# Patient Record
Sex: FEMALE | Race: WHITE | Employment: OTHER | URBAN - METROPOLITAN AREA
[De-identification: names, ages, dates, MRNs, and addresses within clinical notes are randomized per-mention and may not be internally consistent; named-entity substitution may affect disease eponyms.]

---

## 2019-10-14 ENCOUNTER — TELEPHONE (OUTPATIENT)
Dept: ONCOLOGY | Facility: CLINIC | Age: 84
End: 2019-10-14

## 2019-10-14 NOTE — TELEPHONE ENCOUNTER
ONCOLOGY INTAKE: Records Information      APPT INFORMATION:  Referring provider: Dr. Karina Wright   Referring provider s clinic:  [facility]  Reason for visit/diagnosis:  n/a  Has patient been notified of appointment date and time?: n/a    RECORDS INFORMATION:  Were the records received with the referral (via [employer])? Yes - this is documentation that Oncology Intake received records from [facility], but there is no reference in the notes for her to be seen at [facility].  I called [facility] Medical Records to let them know about the fax, and they said they'd look into it.  I have saved this document on the [address] in care there are missing pages to the fax which should have included referral information.

## 2019-10-21 NOTE — TELEPHONE ENCOUNTER
ONCOLOGY INTAKE: Records Information      APPT INFORMATION: 10/25/19 - GE -SH  Referring provider: Helene Yan   Referring provider s clinic:    Reason for visit/diagnosis:  Breast CA, 2nd opinion  Has patient been notified of appointment date and time?: Yes    RECORDS INFORMATION:  Were the records received with the referral (via [employer])? Yes (faxed to records team & HIM for scanning)    Has patient been seen for any external appt for this diagnosis? Yes    If yes, where? [facility]    Has patient had any imaging or procedures outside of [facility] for this condition? Yes      If Yes, where? [facility]    ADDITIONAL INFORMATION:  Scheduled via call from patient

## 2019-10-22 NOTE — TELEPHONE ENCOUNTER
RECORDS STATUS - BREAST    RECORDS REQUESTED FROM: Epic/CE   DATE REQUESTED: 10/25/2019    NOTES DETAILS STATUS   OFFICE NOTE from referring provider Complete  Helene Yan      OFFICE NOTE from medical oncologist Complete Epic    OFFICE NOTE from surgeon Complete EPIC   OFFICE NOTE from radiation oncologist N/A    DISCHARGE SUMMARY from hospital Complete Epic    DISCHARGE REPORT from the ER N/A    OPERATIVE REPORT Complete EPIC   MEDICATION LIST Complete CE-     CLINICAL TRIAL TREATMENTS TO DATE     LABS     PATHOLOGY REPORTS  (Tissue diagnosis, Stage, ER/PA percentage positive and intensity of staining, HER2 IHC, FISH, and all biopsies from breast and any distant metastasis)                 Complete- Bx slides arrived on 11/8/2019  Case: [id number] [facility] 10/22/2019      Case: [id number] [facility] 9/12/2019   Tracking Number: [id number]   GENONOMIC TESTING     TYPE:   (Next Generation Sequencing, including Foundation One testing, and Oncotype score)     IMAGING (NEED IMAGES & REPORT)     CT SCANS     [facility]   NM Breast  Complete  HP    MAMMO Complete  HP    ULTRASOUND Complete  HP    PET Complete      BONE SCAN     BRAIN MRI       Action    Action Taken 10/22/2019 3:15pm     * [facility]:   - Imaging Dept [place] [phone] option 4 IMG will be pushed to PACS.     I sent a fax request for bx to      11/6/2019 10:21am   I received a call from [facility]. They aren't able to locate their slides from 9/12/2019 Case: [id number]. They are going to send over the other path case in the meantime.     I send an ib-message to Dr. Phoenix letting her know that [facility] is missing the path slides from this year.

## 2019-10-25 ENCOUNTER — PRE VISIT (OUTPATIENT)
Dept: ONCOLOGY | Facility: CLINIC | Age: 84
End: 2019-10-25

## 2019-10-25 ENCOUNTER — ONCOLOGY VISIT (OUTPATIENT)
Dept: ONCOLOGY | Facility: CLINIC | Age: 84
End: 2019-10-25
Attending: INTERNAL MEDICINE
Payer: COMMERCIAL

## 2019-10-25 VITALS
WEIGHT: 124 LBS | OXYGEN SATURATION: 97 % | DIASTOLIC BLOOD PRESSURE: 62 MMHG | HEART RATE: 77 BPM | RESPIRATION RATE: 16 BRPM | SYSTOLIC BLOOD PRESSURE: 128 MMHG | HEIGHT: 65 IN | BODY MASS INDEX: 20.66 KG/M2

## 2019-10-25 DIAGNOSIS — Z17.1 MALIGNANT NEOPLASM OF CENTRAL PORTION OF RIGHT BREAST IN FEMALE, ESTROGEN RECEPTOR NEGATIVE (H): ICD-10-CM

## 2019-10-25 DIAGNOSIS — Z80.9 FAMILY HISTORY OF CANCER: Primary | ICD-10-CM

## 2019-10-25 DIAGNOSIS — C50.111 MALIGNANT NEOPLASM OF CENTRAL PORTION OF RIGHT BREAST IN FEMALE, ESTROGEN RECEPTOR NEGATIVE (H): ICD-10-CM

## 2019-10-25 PROCEDURE — G0463 HOSPITAL OUTPT CLINIC VISIT: HCPCS

## 2019-10-25 PROCEDURE — 99205 OFFICE O/P NEW HI 60 MIN: CPT | Performed by: INTERNAL MEDICINE

## 2019-10-25 RX ORDER — ASCORBIC ACID 250 MG
1 TABLET,CHEWABLE ORAL
COMMUNITY

## 2019-10-25 ASSESSMENT — MIFFLIN-ST. JEOR: SCORE: 1015.4

## 2019-10-25 ASSESSMENT — PAIN SCALES - GENERAL: PAINLEVEL: NO PAIN (0)

## 2019-10-25 NOTE — PROGRESS NOTES
"Oncology Rooming Note    October 25, 2019 11:09 AM   Madiha Veras is a 84 year old female who presents for:    Chief Complaint   Patient presents with     Oncology Clinic Visit     Initial Vitals: /62   Pulse 77   Resp 16   Ht 1.638 m (5' 4.5\")   Wt 56.2 kg (124 lb)   SpO2 97%   BMI 20.96 kg/m   Estimated body mass index is 20.96 kg/m  as calculated from the following:    Height as of this encounter: 1.638 m (5' 4.5\").    Weight as of this encounter: 56.2 kg (124 lb). Body surface area is 1.6 meters squared.  No Pain (0) Comment: Data Unavailable   No LMP recorded.  Allergies reviewed: Yes  Medications reviewed: Yes    Medications: Medication refills not needed today.  Pharmacy name entered into [facility]: [id number] [place], [place] - [address] [place] [address]    Clinical concerns: no      Amy Lopez CMA            "

## 2019-10-25 NOTE — PROGRESS NOTES
NEW PATIENT MEDICAL ONCOLOGY CONSULT      REQUESTING PROVIDER: Dr. Marcelino Werner, Oncologist from [facility]      REASON FOR CONSULTATION:    At age 84, dx right breast ILC grade 2, TN in 2019      HISTORY OF ONCOLOGY ILLNESS:    She is dx via MA 2019 right central breast 6:00, 2 cm FN,  ILC, triple negative.    She had no breast complains on presentation.     The patient opted for bilateral mastectomy with reconstruction.   Her final pathology from her bilateral mastectomy with lymph node dissection which was done on 2019 shows:  1. Invasive lobular carcinoma Chapis grade 2 in the right breast,  5.6 cm in size. The tumor focally extended to the anterior inferior margin. T  2. here was angiolymphatic space invasion. Tumor present in random sections of the breast.   3. Three out of the 3 sentinel lymph nodes were involved with carcinoma. The largest metastatic closest was greater than 1 cm. There was extranodal tumor extension present on all 3 LNs, Level 1/2 LN dissection found 6/15 + LN with extra dara extent ion.  4. The left breast had atypical lobular hyperplasia but no invasive carcinoma.  She has stage III disease.       INTERVAL HISTORY: N/A      PAST MEDICAL HISTORY  Recurrent UTI, urinary incontinence  Onychomycosis, actinic keratosis      MEDICATIONS/ALLERY:  Reviewed in Epic system.        SOCIAL HISTORY:    She is . She is retired. She previously worked as a . She denies tobacco use. Rare alcohol use.       FAMILY HISTORY:    Father side almost everyone  from cancer.   Mother  of breast cancer at age 38.      REVIEW OF SYSTEMS:   GENERAL: pt is in usual state of health.  No B symptoms  NEURO:   No headache, double vision, or focal weakness.  No neuropathy.   SKIN:  No chronic skin rash or skin infection.   CARDIOVASCULAR:  No High blood pressure or hyperlipidemia. NO DAVALOS  PULMONARY:  No shortness of breath, no pleurisy, no cough, no hemoptysis.   GI:   "No abdominal pain, nausea, vomiting, constipation.  No diarrhea.  No bright red blood per rectum.   :  Recurrent UTI, urinary incontinence  RHEUMATOLOGY/MUSCULOSKELETAL SYSTEM:  no arthritic pain, or muscle pain. No muscle ache.   ENDOCRINE:  No history of diabetes or thyroid problem. No complaints of hot flashes.   HEMATOLOGY:  No history of bleeding or thrombosis episode.   Oncology: dx right breast cancer in 2019 at age 84.  IMMUNOLOGY:  No recurrent fever or chills episode.  No recurrent infectious episode.   BREASTS/GYN: No breast complains or vaginal dryness  PSYCHIATRY:  No anxiety or depression.          PHYSICAL EXAMINATION:   VITAL SIGNS: Blood pressure 128/62, pulse 77, resp. rate 16, height 1.638 m (5' 4.5\"), weight 56.2 kg (124 lb), SpO2 97 %.       GENERAL APPEARANCE:  looks like stated age, very pleasant, not in acute distress.     ECO    ENT, MOUTH: Pupils are equally reactive to light.  Sclerae are anicteric.  Moist oral mucosa without lesion or ulcer.   Negative pharynx.  No oral thrush.   NECK:  Supple.  No jugular venous distention.  Thyroid is not palpable.   LYMPH NODES:  Superficial lymphadenopathy is not appreciable in the bilateral cervical, supraclavicular, axillary or inguinal areas.   CARDIOVASCULAR:  S1, S2 regular with no murmurs or gallops.  No carotid or abdominal bruits.   PULMONARY:  Lungs are clear to auscultation and percussion bilaterally.  There is no wheezing or rhonchi.   GASTROINTESTINAL:  Abdomen is soft, nontender.  No hepatosplenomegaly.  No signs of ascites.  No mass appreciable.   MUSCULOSKELETAL/EXTREMITIES:  No edema.  No cyanotic changes.  No signs of joint deformity.  No lymphedema.   NEUROLOGIC:  Cranial nerves II-XII are grossly intact.  Sensation intact.  Muscle strength and muscle tone symmetrical, 5/5 throughout.   BACK  No spinal or paraspinal tenderness.  No CVA tenderness.   SKIN:  No petechiae.  No rash.  No signs of cellulitis. "   BREASTS/CHEST/AXILLA:  Bilateral breast exam revealed well-healed double mastectomy scars, she already have expanders in.    PSYCHIATRIC: Oriented to time, person, and places. Normal mood and affect. Good memory. Proper insight and judgement.       CURRENT LAB DATA REVIEWED  bilateral mastectomy with lymph node dissection which was done on 09/17/2019 shows:  1. Invasive lobular carcinoma Chapis grade 2 in the right breast,  5.6 cm in size. The tumor focally extended to the anterior inferior margin. T  2. here was angiolymphatic space invasion. Tumor present in random sections of the breast.   3. Three out of the 3 sentinel lymph nodes were involved with carcinoma. The largest metastatic closest was greater than 1 cm. There was extranodal tumor extension present. Level 1/2 LN dissection found 6/15 + LN with extra dara extent ion.  4. The left breast had atypical lobular hyperplasia but no invasive carcinoma.    8/2019 US biopsy found invasive lobular carcinoma that was Trinidad grade 2, triple negative.        CURRENT IMAGING REVIEWED  PET scan 9/2019 showed disease that appeared to be confined to the breast without any evidence of regional or distant metastasis.    Breast MRI 9/2019 showed a large right breast malignancy involving primarily the lower outer quadrant that was technically multicentric crossing the midline. There is no evidence of synchronous disease.    Screening mammogram on August 19, 2019 showed an architectural distortion with asymmetry in the right breast in the retroareolar plane at middle depth with new nipple retraction.        OLD DATA REVIEWED TODAY WITH SUMMARY:   No MA in 2017 and 2018.   MA in 2016 - negative.         ASSESSMENT AND PLAN:    At age 84, in September 2019 she is diagnosed with right breast central quadrant invasive lobular cancer triple negative disease with [facility].  She had double mastectomy found extensive disease,  Invasive lobular carcinoma Trinidad  grade 2, 5.6 cm in size, TN. The tumor focally extended to the anterior inferior margin. + angiolymphatic space invasion. Tumor present in random sections of the breast.  3 sentinel lymph nodes were involved with carcinoma. The largest metastatic closest was greater than 1 cm. + extranodal tumor extension present. Level 1/2 LN dissection found 6/15 + LN with extra dara extent ion, pIIIB disease.     We will request the slides to be [facility].    Due to her locally advanced nature, with adverse features, her excellent performance status despite age 84, minimal comorbidities, recommend her to consider adjuvant chemotherapy.    Due to age would consider TC minimal 4 potentially 6 cycles.  If she has hard time with TC, may consider de-escalating the treatment to weekly Taxol.    We discussed chemotherapy side effects,  which include but not limited to GI toxicity,  nausea vomiting, lower immunity and bleeding risk from cytopenia, neurotoxicity, cardica toxicity,  infusion related reaction, mortality, etc.   Patient is informed she will need a port placement for TC.    She will benefit from postmastectomy radiation due to her tumor features, and positive margins.     This is still a second time patient is hearing the above recommendations.  She looks very comfortable in understanding the necessitates  and willing to proceed.    2. Extensive family history of cancer.   Recommend her genetic counseling.       All Qs are answered to pt's satisfaction.

## 2019-10-25 NOTE — LETTER
"    10/25/2019         RE: Madiha Veras  [address] [address]        Dear Colleague,    Thank you for referring your patient, Madiha Veras, to the [facility]. Please see a copy of my visit note below.    Oncology Rooming Note    October 25, 2019 11:09 AM   Madiha Veras is a 84 year old female who presents for:    Chief Complaint   Patient presents with     Oncology Clinic Visit     Initial Vitals: /62   Pulse 77   Resp 16   Ht 1.638 m (5' 4.5\")   Wt 56.2 kg (124 lb)   SpO2 97%   BMI 20.96 kg/m    Estimated body mass index is 20.96 kg/m  as calculated from the following:    Height as of this encounter: 1.638 m (5' 4.5\").    Weight as of this encounter: 56.2 kg (124 lb). Body surface area is 1.6 meters squared.  No Pain (0) Comment: Data Unavailable   No LMP recorded.  Allergies reviewed: Yes  Medications reviewed: Yes    Medications: Medication refills not needed today.  Pharmacy name entered into [employer]: [id number] - [place], [place] - [address]    Clinical concerns: no      Amy Lopez, [facility]              NEW PATIENT MEDICAL ONCOLOGY CONSULT      REQUESTING PROVIDER: Dr. Marcelino Werner, Oncologist from [facility]      REASON FOR CONSULTATION:    At age 84, dx right breast ILC grade 2, TN in 9/2019      HISTORY OF ONCOLOGY ILLNESS:    She is dx via MA 9/2019 right central breast 6:00, 2 cm FN,  ILC, triple negative.    She had no breast complains on presentation.     The patient opted for bilateral mastectomy with reconstruction.   Her final pathology from her bilateral mastectomy with lymph node dissection which was done on 09/17/2019 shows:  1. Invasive lobular carcinoma Palmer grade 2 in the right breast,  5.6 cm in size. The tumor focally extended to the anterior inferior margin. T  2. here was angiolymphatic space invasion. Tumor present in random sections of the breast.   3. Three out of the 3 sentinel lymph nodes were " "involved with carcinoma. The largest metastatic closest was greater than 1 cm. There was extranodal tumor extension present.   4. The left breast had atypical lobular hyperplasia but no invasive carcinoma.  She has stage III disease.       INTERVAL HISTORY: N/A      PAST MEDICAL HISTORY  Recurrent UTI, urinary incontinence  Onychomycosis, actinic keratosis      MEDICATIONS/ALLERY:  Reviewed in Epic system.        SOCIAL HISTORY:    She is . She is retired. She previously worked as a . She denies tobacco use. Rare alcohol use.       FAMILY HISTORY:    Father side almost everyone  from cancer.   Mother  of breast cancer at age 38.      REVIEW OF SYSTEMS:   GENERAL: pt is in usual state of health.  No B symptoms  NEURO:   No headache, double vision, or focal weakness.  No neuropathy.   SKIN:  No chronic skin rash or skin infection.   CARDIOVASCULAR:  No High blood pressure or hyperlipidemia. NO DAVALOS  PULMONARY:  No shortness of breath, no pleurisy, no cough, no hemoptysis.   GI:  No abdominal pain, nausea, vomiting, constipation.  No diarrhea.  No bright red blood per rectum.   :   Recurrent UTI, urinary incontinence  RHEUMATOLOGY/MUSCULOSKELETAL SYSTEM:  no arthritic pain, or muscle pain. No muscle ache.   ENDOCRINE:  No history of diabetes or thyroid problem. No complaints of hot flashes.   HEMATOLOGY:  No history of bleeding or thrombosis episode.   Oncology: dx right breast cancer in 2019 at age 84.  IMMUNOLOGY:  No recurrent fever or chills episode.  No recurrent infectious episode.   BREASTS/GYN: No breast complains or vaginal dryness  PSYCHIATRY:  No anxiety or depression.          PHYSICAL EXAMINATION:   VITAL SIGNS: Blood pressure 128/62, pulse 77, resp. rate 16, height 1.638 m (5' 4.5\"), weight 56.2 kg (124 lb), SpO2 97 %.       GENERAL APPEARANCE:  looks like stated age, very pleasant, not in acute distress.     ECO    ENT, MOUTH: Pupils are equally reactive to " light.  Sclerae are anicteric.  Moist oral mucosa without lesion or ulcer.   Negative pharynx.  No oral thrush.   NECK:  Supple.  No jugular venous distention.  Thyroid is not palpable.   LYMPH NODES:  Superficial lymphadenopathy is not appreciable in the bilateral cervical, supraclavicular, axillary or inguinal areas.   CARDIOVASCULAR:  S1, S2 regular with no murmurs or gallops.  No carotid or abdominal bruits.   PULMONARY:  Lungs are clear to auscultation and percussion bilaterally.  There is no wheezing or rhonchi.   GASTROINTESTINAL:  Abdomen is soft, nontender.  No hepatosplenomegaly.  No signs of ascites.  No mass appreciable.   MUSCULOSKELETAL/EXTREMITIES:  No edema.  No cyanotic changes.  No signs of joint deformity.  No lymphedema.   NEUROLOGIC:  Cranial nerves II-XII are grossly intact.  Sensation intact.  Muscle strength and muscle tone symmetrical, 5/5 throughout.   BACK  No spinal or paraspinal tenderness.  No CVA tenderness.   SKIN:  No petechiae.  No rash.  No signs of cellulitis.   BREASTS/CHEST/AXILLA:  Bilateral breast exam revealed well-healed double mastectomy scars, she already have expanders in.    PSYCHIATRIC: Oriented to time, person, and places. Normal mood and affect. Good memory. Proper insight and judgement.       CURRENT LAB DATA REVIEWED  bilateral mastectomy with lymph node dissection which was done on 09/17/2019 shows:  1. Invasive lobular carcinoma Girard grade 2 in the right breast,  5.6 cm in size. The tumor focally extended to the anterior inferior margin. T  2. here was angiolymphatic space invasion. Tumor present in random sections of the breast.   3. Three out of the 3 sentinel lymph nodes were involved with carcinoma. The largest metastatic closest was greater than 1 cm. There was extranodal tumor extension present.   4. The left breast had atypical lobular hyperplasia but no invasive carcinoma.    8/2019 US biopsy found invasive lobular carcinoma that was Chapis grade  2, triple negative.        CURRENT IMAGING REVIEWED  PET scan 9/2019 showed disease that appeared to be confined to the breast without any evidence of regional or distant metastasis.    Breast MRI 9/2019 showed a large right breast malignancy involving primarily the lower outer quadrant that was technically multicentric crossing the midline. There is no evidence of synchronous disease.    Screening mammogram on August 19, 2019 showed an architectural distortion with asymmetry in the right breast in the retroareolar plane at middle depth with new nipple retraction.        OLD DATA REVIEWED TODAY WITH SUMMARY:   No MA in 2017 and 2018.   MA in 2016 - negative.         ASSESSMENT AND PLAN:    At age 84, in September 2019 she is diagnosed with right breast central quadrant invasive lobular cancer triple negative disease with [facility].  She had double mastectomy found extensive disease,  Invasive lobular carcinoma Chapis grade 2, 5.6 cm in size, TN. The tumor focally extended to the anterior inferior margin. + angiolymphatic space invasion. Tumor present in random sections of the breast.  3 sentinel lymph nodes were involved with carcinoma. The largest metastatic closest was greater than 1 cm. + extranodal tumor extension present. pIIIB disease.     We will request the slides to be reviewed by [facility].    Due to her locally advanced nature, with adverse features, her excellent performance status despite age 84, minimal comorbidities, recommend her to consider adjuvant chemotherapy.    Due to age would consider TC minimal 4 potentially 6 cycles.  If she has hard time with TC, may consider de-escalating the treatment to weekly Taxol.    We discussed chemotherapy side effects,  which include but not limited to GI toxicity,  nausea vomiting, lower immunity and bleeding risk from cytopenia, neurotoxicity, cardica toxicity,  infusion related reaction, mortality, etc.   Patient is informed she  will need a port placement for TC.    She will benefit from postmastectomy radiation due to her tumor features, and positive margins.     This is still a second time patient is hearing the above recommendations.  She looks very comfortable in understanding the necessitates  and willing to proceed.    2. Extensive family history of cancer.   Recommend her genetic counseling.       All Qs are answered to pt's satisfaction.         Again, thank you for allowing me to participate in the care of your patient.        Sincerely,        Wendy Phoenix MD, MD

## 2019-11-11 PROCEDURE — 00000346 ZZHCL STATISTIC REVIEW OUTSIDE SLIDES TC 88321: Performed by: INTERNAL MEDICINE

## 2019-12-12 LAB — COPATH REPORT: NORMAL

## 2021-05-27 ENCOUNTER — RECORDS - HEALTHEAST (OUTPATIENT)
Dept: ADMINISTRATIVE | Facility: CLINIC | Age: 86
End: 2021-05-27

## 2021-05-29 ENCOUNTER — RECORDS - HEALTHEAST (OUTPATIENT)
Dept: ADMINISTRATIVE | Facility: CLINIC | Age: 86
End: 2021-05-29

## 2024-09-26 ENCOUNTER — LAB REQUISITION (OUTPATIENT)
Dept: LAB | Facility: CLINIC | Age: 89
End: 2024-09-26
Payer: COMMERCIAL

## 2024-09-26 DIAGNOSIS — D64.9 ANEMIA, UNSPECIFIED: ICD-10-CM

## 2024-09-30 LAB
ANION GAP SERPL CALCULATED.3IONS-SCNC: 12 MMOL/L (ref 7–15)
BUN SERPL-MCNC: 24.5 MG/DL (ref 8–23)
CALCIUM SERPL-MCNC: 9.1 MG/DL (ref 8.8–10.4)
CHLORIDE SERPL-SCNC: 104 MMOL/L (ref 98–107)
CREAT SERPL-MCNC: 0.95 MG/DL (ref 0.51–0.95)
EGFRCR SERPLBLD CKD-EPI 2021: 58 ML/MIN/1.73M2
ERYTHROCYTE [DISTWIDTH] IN BLOOD BY AUTOMATED COUNT: 20.1 % (ref 10–15)
GLUCOSE SERPL-MCNC: 134 MG/DL (ref 70–99)
HCO3 SERPL-SCNC: 23 MMOL/L (ref 22–29)
HCT VFR BLD AUTO: 26.9 % (ref 35–47)
HGB BLD-MCNC: 8.1 G/DL (ref 11.7–15.7)
MCH RBC QN AUTO: 29.3 PG (ref 26.5–33)
MCHC RBC AUTO-ENTMCNC: 30.1 G/DL (ref 31.5–36.5)
MCV RBC AUTO: 98 FL (ref 78–100)
PLATELET # BLD AUTO: 285 10E3/UL (ref 150–450)
POTASSIUM SERPL-SCNC: 4.2 MMOL/L (ref 3.4–5.3)
RBC # BLD AUTO: 2.76 10E6/UL (ref 3.8–5.2)
SODIUM SERPL-SCNC: 139 MMOL/L (ref 135–145)
WBC # BLD AUTO: 6.1 10E3/UL (ref 4–11)

## 2024-09-30 PROCEDURE — 80048 BASIC METABOLIC PNL TOTAL CA: CPT | Mod: ORL | Performed by: INTERNAL MEDICINE

## 2024-09-30 PROCEDURE — 85027 COMPLETE CBC AUTOMATED: CPT | Mod: ORL | Performed by: INTERNAL MEDICINE

## 2024-09-30 PROCEDURE — 36415 COLL VENOUS BLD VENIPUNCTURE: CPT | Mod: ORL | Performed by: INTERNAL MEDICINE

## 2024-09-30 PROCEDURE — P9603 ONE-WAY ALLOW PRORATED MILES: HCPCS | Mod: ORL | Performed by: INTERNAL MEDICINE
